# Patient Record
Sex: MALE | Race: BLACK OR AFRICAN AMERICAN | NOT HISPANIC OR LATINO | Employment: PART TIME | URBAN - METROPOLITAN AREA
[De-identification: names, ages, dates, MRNs, and addresses within clinical notes are randomized per-mention and may not be internally consistent; named-entity substitution may affect disease eponyms.]

---

## 2023-08-08 ENCOUNTER — TRANSCRIBE ORDERS (OUTPATIENT)
Dept: PHYSICAL THERAPY | Facility: CLINIC | Age: 43
End: 2023-08-08
Payer: COMMERCIAL

## 2023-08-08 DIAGNOSIS — S39.012A STRAIN OF LUMBAR REGION, INITIAL ENCOUNTER: Primary | ICD-10-CM

## 2023-08-28 ENCOUNTER — TREATMENT (OUTPATIENT)
Dept: PHYSICAL THERAPY | Facility: CLINIC | Age: 43
End: 2023-08-28
Payer: OTHER MISCELLANEOUS

## 2023-08-28 DIAGNOSIS — S39.012D STRAIN OF LUMBAR REGION, SUBSEQUENT ENCOUNTER: Primary | ICD-10-CM

## 2023-08-28 NOTE — PROGRESS NOTES
Physical Therapy Initial Evaluation and Plan of Care  [address], [address]  [place] [address]    Patient: Marty Gipson   : 1980  Diagnosis/ICD-10 Code:  Strain of lumbar region, subsequent encounter [S39.012D]  Referring practitioner: Maximiliano Garcia PA-C  Date of Initial Visit: 2023  Today's Date: 2023  Patient seen for 1 session         Visit Diagnoses:    ICD-10-CM ICD-9-CM   1. Strain of lumbar region, subsequent encounter  S39.012D V58.89     847.2         Subjective Questionnaire: Oswestry:       Subjective Evaluation    History of Present Illness  Onset date: about a month ago.  Mechanism of injury: Patient reports that he grabbed a 100 foot ladder and felt a pull in the low back.  Patient kept working and states that later in the day he grabbed a 90 lbs box from overhead and felt pain in the low back extending down both legs.  Patient was seen in [facility] the following day.    Patient denies any previous back injuries.      Patient Occupation: [employer]   Precautions and Work Restrictions: light dutyPain  Current pain ratin  At worst pain rating: 10  Location: bilateral lumbar spine  Quality: dull ache and throbbing  Relieving factors: medications and heat  Exacerbated by: nothing specific.  Progression: improved           Objective          Postural Observations    Additional Postural Observation Details  Normal sit to stand.  Patient ambulates with a normal gait pattern.    Palpation     Additional Palpation Details  TTP to the left lumbar pvms.    Active Range of Motion     Lumbar   Flexion: Active lumbar flexion: WNL.   Extension: Active lumbar extension: about 25. with pain  Left lateral flexion: Active left lumbar lateral flexion: about 30.   Right lateral flexion: Active right lumbar lateral flexion: about 20. with pain    Strength/Myotome Testing     Left Hip   Planes of Motion   Flexion: 4+  Abduction: 4+  Adduction: 4+    Right Hip   Planes of Motion    Flexion: 4+  Abduction: 4+  Adduction: 4+    Left Ankle/Foot   Dorsiflexion: 5    Right Ankle/Foot   Dorsiflexion: 5    Tests     Additional Tests Details  - SLR bilaterally  + KIMBER on the left for LBP        Assessment & Plan       Assessment  Impairments: abnormal or restricted ROM, activity intolerance, lacks appropriate home exercise program and pain with function   Assessment details: Patient presents with c/o pain, TTP, limited AROM of the lumbar spine and positive special testing which is limiting his ability to perform full job duties and ADL'S.  Barriers to therapy: none  Prognosis: good  Prognosis details: STG's to be met by 2 weeks  1)  Independent with HEP  2)  Decrease pain by 50% or more  3)  AROM WNL for the lumbar spine to allow for return to functional activities  4)  Min to no TTP present    LTG's to be met by 4 weeks  1)  Independent with HEP progression  2)  Decrease pain by 75% or more  3)  Negative special testing  4)  No TTP present to indicate improved healing response  5)  Patient to lift floor to waist up to 50 lbs  6)  Patient to return to ADL'S without limitations        Plan  Therapy options: will be seen for skilled therapy services  Planned therapy interventions: strengthening, stretching, therapeutic activities, home exercise program and neuromuscular re-education  Frequency: 2x week  Duration in weeks: 4  Treatment plan discussed with: patient          Timed:         Manual Therapy:    0     mins  93248;     Therapeutic Exercise:    13     mins  05864;    Neuromuscular Gonzalo:    8    mins  19585;    Therapeutic Activity:     0     mins  48221;     Gait Trainin     mins  90975;     Ultrasound:     0     mins  03436;          Un-Timed:  Electrical Stimulation:    0     mins  14465 ( );    Low Eval     12     Mins  09391  Mod Eval     0     Mins  41724  High Eval                       0     Mins  10011        Timed Treatment:   21   mins   Total Treatment:     33    mins          PT: Caleb Serrano, PT     [place] License [id number]  Electronically signed by Caleb Serrano PT, 08/28/23, 1:58 PM EDT    Certification Period: 8/28/2023 thru 11/25/2023  I certify that the therapy services are furnished while this patient is under my care.  The services outlined above are required by this patient, and will be reviewed every 90 days.    Maximiliano Garcia Pa-c  [address] [place] [address]   NPI: [id number]      Caleb Serrano, PT   License number: [id number]        Physician Signature:__________________________________________________    PHYSICIAN: Maximiliano Garcia PA-C      DATE:     Please sign and return via fax to [email or web address] . Thank you, [facility].

## 2023-10-11 ENCOUNTER — DOCUMENTATION (OUTPATIENT)
Dept: PHYSICAL THERAPY | Facility: CLINIC | Age: 43
End: 2023-10-11
Payer: COMMERCIAL